# Patient Record
Sex: FEMALE | Race: WHITE | NOT HISPANIC OR LATINO | Employment: FULL TIME | URBAN - METROPOLITAN AREA
[De-identification: names, ages, dates, MRNs, and addresses within clinical notes are randomized per-mention and may not be internally consistent; named-entity substitution may affect disease eponyms.]

---

## 2022-02-22 ENCOUNTER — OFFICE VISIT (OUTPATIENT)
Dept: INTERNAL MEDICINE | Facility: CLINIC | Age: 37
End: 2022-02-22

## 2022-02-22 VITALS
RESPIRATION RATE: 14 BRPM | BODY MASS INDEX: 26.12 KG/M2 | WEIGHT: 147.4 LBS | SYSTOLIC BLOOD PRESSURE: 120 MMHG | HEART RATE: 78 BPM | HEIGHT: 63 IN | OXYGEN SATURATION: 97 % | DIASTOLIC BLOOD PRESSURE: 70 MMHG | TEMPERATURE: 98.5 F

## 2022-02-22 DIAGNOSIS — Z13.29 THYROID DISORDER SCREEN: ICD-10-CM

## 2022-02-22 DIAGNOSIS — Z00.00 ANNUAL PHYSICAL EXAM: Primary | ICD-10-CM

## 2022-02-22 DIAGNOSIS — R10.32 LEFT LOWER QUADRANT ABDOMINAL PAIN: ICD-10-CM

## 2022-02-22 DIAGNOSIS — Z13.220 LIPID SCREENING: ICD-10-CM

## 2022-02-22 LAB
ALBUMIN SERPL-MCNC: 4.7 G/DL (ref 3.5–5.2)
ALBUMIN/GLOB SERPL: 1.9 G/DL
ALP SERPL-CCNC: 76 U/L (ref 39–117)
ALT SERPL W P-5'-P-CCNC: 25 U/L (ref 1–33)
ANION GAP SERPL CALCULATED.3IONS-SCNC: 12.6 MMOL/L (ref 5–15)
AST SERPL-CCNC: 31 U/L (ref 1–32)
B-HCG UR QL: NEGATIVE
BASOPHILS # BLD AUTO: 0.04 10*3/MM3 (ref 0–0.2)
BASOPHILS NFR BLD AUTO: 0.5 % (ref 0–1.5)
BILIRUB SERPL-MCNC: 0.4 MG/DL (ref 0–1.2)
BILIRUB UR QL STRIP: NEGATIVE
BUN SERPL-MCNC: 13 MG/DL (ref 6–20)
BUN/CREAT SERPL: 22.8 (ref 7–25)
CALCIUM SPEC-SCNC: 9.4 MG/DL (ref 8.6–10.5)
CHLORIDE SERPL-SCNC: 100 MMOL/L (ref 98–107)
CHOLEST SERPL-MCNC: 154 MG/DL (ref 0–200)
CLARITY UR: CLEAR
CO2 SERPL-SCNC: 24.4 MMOL/L (ref 22–29)
COLOR UR: YELLOW
CREAT SERPL-MCNC: 0.57 MG/DL (ref 0.57–1)
DEPRECATED RDW RBC AUTO: 44.2 FL (ref 37–54)
EOSINOPHIL # BLD AUTO: 0.17 10*3/MM3 (ref 0–0.4)
EOSINOPHIL NFR BLD AUTO: 2.2 % (ref 0.3–6.2)
ERYTHROCYTE [DISTWIDTH] IN BLOOD BY AUTOMATED COUNT: 12.4 % (ref 12.3–15.4)
EXPIRATION DATE: NORMAL
GFR SERPL CREATININE-BSD FRML MDRD: 121 ML/MIN/1.73
GLOBULIN UR ELPH-MCNC: 2.5 GM/DL
GLUCOSE SERPL-MCNC: 87 MG/DL (ref 65–99)
GLUCOSE UR STRIP-MCNC: NEGATIVE MG/DL
HCT VFR BLD AUTO: 43.4 % (ref 34–46.6)
HDLC SERPL-MCNC: 75 MG/DL (ref 40–60)
HGB BLD-MCNC: 14.3 G/DL (ref 12–15.9)
HGB UR QL STRIP.AUTO: NEGATIVE
IMM GRANULOCYTES # BLD AUTO: 0.02 10*3/MM3 (ref 0–0.05)
IMM GRANULOCYTES NFR BLD AUTO: 0.3 % (ref 0–0.5)
INTERNAL NEGATIVE CONTROL: NORMAL
INTERNAL POSITIVE CONTROL: NORMAL
KETONES UR QL STRIP: NEGATIVE
LDLC SERPL CALC-MCNC: 71 MG/DL (ref 0–100)
LDLC/HDLC SERPL: 0.96 {RATIO}
LEUKOCYTE ESTERASE UR QL STRIP.AUTO: NEGATIVE
LYMPHOCYTES # BLD AUTO: 3.73 10*3/MM3 (ref 0.7–3.1)
LYMPHOCYTES NFR BLD AUTO: 47.5 % (ref 19.6–45.3)
Lab: NORMAL
MCH RBC QN AUTO: 32.1 PG (ref 26.6–33)
MCHC RBC AUTO-ENTMCNC: 32.9 G/DL (ref 31.5–35.7)
MCV RBC AUTO: 97.5 FL (ref 79–97)
MONOCYTES # BLD AUTO: 0.72 10*3/MM3 (ref 0.1–0.9)
MONOCYTES NFR BLD AUTO: 9.2 % (ref 5–12)
NEUTROPHILS NFR BLD AUTO: 3.18 10*3/MM3 (ref 1.7–7)
NEUTROPHILS NFR BLD AUTO: 40.3 % (ref 42.7–76)
NITRITE UR QL STRIP: NEGATIVE
NRBC BLD AUTO-RTO: 0 /100 WBC (ref 0–0.2)
PH UR STRIP.AUTO: 5.5 [PH] (ref 5–8)
PLATELET # BLD AUTO: 212 10*3/MM3 (ref 140–450)
PMV BLD AUTO: 11.4 FL (ref 6–12)
POTASSIUM SERPL-SCNC: 4.3 MMOL/L (ref 3.5–5.2)
PROT SERPL-MCNC: 7.2 G/DL (ref 6–8.5)
PROT UR QL STRIP: NEGATIVE
RBC # BLD AUTO: 4.45 10*6/MM3 (ref 3.77–5.28)
SODIUM SERPL-SCNC: 137 MMOL/L (ref 136–145)
SP GR UR STRIP: 1 (ref 1–1.03)
TRIGL SERPL-MCNC: 34 MG/DL (ref 0–150)
TSH SERPL DL<=0.05 MIU/L-ACNC: 2.7 UIU/ML (ref 0.27–4.2)
UROBILINOGEN UR QL STRIP: NORMAL
VLDLC SERPL-MCNC: 8 MG/DL (ref 5–40)
WBC NRBC COR # BLD: 7.86 10*3/MM3 (ref 3.4–10.8)

## 2022-02-22 PROCEDURE — 81025 URINE PREGNANCY TEST: CPT | Performed by: NURSE PRACTITIONER

## 2022-02-22 PROCEDURE — 80061 LIPID PANEL: CPT | Performed by: NURSE PRACTITIONER

## 2022-02-22 PROCEDURE — 80053 COMPREHEN METABOLIC PANEL: CPT | Performed by: NURSE PRACTITIONER

## 2022-02-22 PROCEDURE — 85025 COMPLETE CBC W/AUTO DIFF WBC: CPT | Performed by: NURSE PRACTITIONER

## 2022-02-22 PROCEDURE — 84443 ASSAY THYROID STIM HORMONE: CPT | Performed by: NURSE PRACTITIONER

## 2022-02-22 PROCEDURE — 99385 PREV VISIT NEW AGE 18-39: CPT | Performed by: NURSE PRACTITIONER

## 2022-02-22 NOTE — ASSESSMENT & PLAN NOTE
We did discuss differentials including kidney stones, UTI, IBS, constipation, gastritis.  We will take a conservative approach as there are no red flag warning signs today.  We will start with labs and UA today.  We will follow-up with patient based on the results.  Patient agrees to this plan.  Advised to contact us if symptoms are worsening.

## 2022-02-22 NOTE — ASSESSMENT & PLAN NOTE
Annual physical exam completed, no significant abnormal findings.  We will get some lab work today, follow-up based on results.  Next annual physical in 1 year.

## 2022-02-22 NOTE — PROGRESS NOTES
Chief Complaint  Establish Care, Abdominal Pain (Started about a year ago, really noticed it on [place] day.), and Constipation    Subjective         Angelica Martinez presents to [facility] for Establishment of care and abdominal pain.    Patient is here today to establish care with new primary care provider, she is been seen a different PCP in the past, located in [facility], she was not happy with the care being provided there.  She reports no significant past medical problems.  She is not on any medications, she has no known allergies.  From health maintenance standpoint, she is up-to-date on her Pap smear, she saw Dr. Can with [facility] last March.  She is not due for another 2 years.  Patient denies COVID-19 vaccine, denies influenza vaccine.  Patient does not recall any lab work in the last 2 years.  Patient does admit to drinking, she usually drinks 1 pint per week, she drinks roughly 4-5 drinks per occasion.  Patient does smoke, 2 to 3 cigarettes/day, this does go up when she is drinking.  Patient admits to smoking marijuana, this is daily.  No other illicit drug use.    Patient is here today with an acute concern for abdominal pain.  She has not brought this to anyone's attention in the past.  She is stating is been going on for several months.  She most notably had issues on [place] Day, she had pain in the left lower quadrant, just above the hip.  Patient denies any other symptoms other than more of a very intense gas type pain.  No fevers, no chills, no nausea, no vomiting, no diarrhea.  Patient reports that her bowel movements are irregular, sometimes she has backed up, sometimes she is having more frequent, more loose bowel movements.  Patient reports that the pain comes and goes, she will notice it about once every 2 weeks.  She has not brought this to any other healthcare providers attention.  She has not had any lab work or imaging done  "prior to.  She has not had any emergency room visits.    Patient denies any other significant concerns or complaints today.         Review of Systems   Constitutional: Negative for chills, fatigue and fever.   HENT: Negative for congestion and sore throat.    Respiratory: Negative for cough and shortness of breath.    Cardiovascular: Negative for chest pain.   Gastrointestinal: Positive for abdominal pain. Negative for abdominal distention, anal bleeding, blood in stool, constipation, diarrhea, nausea, rectal pain and vomiting.   Genitourinary: Negative for decreased urine volume, dysuria, flank pain, frequency, hematuria, menstrual problem, pelvic pain, urgency, vaginal bleeding, vaginal discharge and vaginal pain.   Skin: Negative for rash.   Neurological: Negative for headaches.   Psychiatric/Behavioral: Negative for dysphoric mood. The patient is not nervous/anxious.        Objective   Vital Signs:   /70   Pulse 78   Temp 98.5 °F (36.9 °C) (Temporal)   Resp 14   Ht 160 cm (63\")   Wt 66.9 kg (147 lb 6.4 oz)   SpO2 97%   BMI 26.11 kg/m²     Physical Exam  Vitals and nursing note reviewed.   Constitutional:       Appearance: Normal appearance. She is normal weight.   HENT:      Head: Normocephalic and atraumatic.      Right Ear: Tympanic membrane, ear canal and external ear normal.      Left Ear: Tympanic membrane, ear canal and external ear normal.      Nose: Nose normal.      Mouth/Throat:      Mouth: Mucous membranes are moist.      Pharynx: Oropharynx is clear.   Eyes:      Conjunctiva/sclera: Conjunctivae normal.      Pupils: Pupils are equal, round, and reactive to light.   Cardiovascular:      Rate and Rhythm: Normal rate and regular rhythm.   Pulmonary:      Effort: Pulmonary effort is normal.      Breath sounds: Normal breath sounds.   Abdominal:      General: Abdomen is flat. Bowel sounds are normal.      Palpations: Abdomen is soft.      Comments: Mild tenderness to palpation to the left " lower quadrant.   Skin:     General: Skin is warm and dry.   Neurological:      Mental Status: She is alert.   Psychiatric:         Mood and Affect: Mood normal.         Thought Content: Thought content normal.        Result Review :                   Diagnoses and all orders for this visit:    1. Annual physical exam (Primary)  Assessment & Plan:  Annual physical exam completed, no significant abnormal findings.  We will get some lab work today, follow-up based on results.  Next annual physical in 1 year.    Orders:  -     Comprehensive Metabolic Panel  -     CBC & Differential    2. Lipid screening  -     Lipid Panel    3. Thyroid disorder screen  -     TSH    4. Left lower quadrant abdominal pain  Assessment & Plan:  We did discuss differentials including kidney stones, UTI, IBS, constipation, gastritis.  We will take a conservative approach as there are no red flag warning signs today.  We will start with labs and UA today.  We will follow-up with patient based on the results.  Patient agrees to this plan.  Advised to contact us if symptoms are worsening.    Orders:  -     Urinalysis With Culture If Indicated -; Future  -     Urinalysis With Culture If Indicated -  -     POC HCG, Urine, By Visual Color        Follow Up   Return in about 1 year (around 2/22/2023) for Annual physical.    Patient was given instructions and counseling regarding her condition or for health maintenance advice. Please see specific information pulled into the AVS if appropriate.     Screening labs reviewed/ordered  Counseling provided regarding age appropriate screenings and immunizations, healthy diet and exercise.     YANIQUE Freedman  2/22/2022  This note was electronically signed.

## 2022-03-15 ENCOUNTER — OFFICE VISIT (OUTPATIENT)
Dept: OBSTETRICS AND GYNECOLOGY | Facility: CLINIC | Age: 37
End: 2022-03-15

## 2022-03-15 VITALS
HEIGHT: 63 IN | HEART RATE: 55 BPM | WEIGHT: 146 LBS | DIASTOLIC BLOOD PRESSURE: 81 MMHG | BODY MASS INDEX: 25.87 KG/M2 | SYSTOLIC BLOOD PRESSURE: 116 MMHG

## 2022-03-15 DIAGNOSIS — Z30.431 ENCOUNTER FOR ROUTINE CHECKING OF INTRAUTERINE CONTRACEPTIVE DEVICE (IUD): ICD-10-CM

## 2022-03-15 DIAGNOSIS — R10.2 PELVIC PAIN IN FEMALE: Primary | ICD-10-CM

## 2022-03-15 DIAGNOSIS — N76.0 ACUTE VAGINITIS: ICD-10-CM

## 2022-03-15 LAB
C TRACH RRNA CVX QL NAA+PROBE: NOT DETECTED
CANDIDA SPECIES: NEGATIVE
GARDNERELLA VAGINALIS: NEGATIVE
N GONORRHOEA RRNA SPEC QL NAA+PROBE: NOT DETECTED
T VAGINALIS DNA VAG QL PROBE+SIG AMP: NEGATIVE

## 2022-03-15 PROCEDURE — 87491 CHLMYD TRACH DNA AMP PROBE: CPT | Performed by: OBSTETRICS & GYNECOLOGY

## 2022-03-15 PROCEDURE — 87510 GARDNER VAG DNA DIR PROBE: CPT | Performed by: OBSTETRICS & GYNECOLOGY

## 2022-03-15 PROCEDURE — 99213 OFFICE O/P EST LOW 20 MIN: CPT | Performed by: OBSTETRICS & GYNECOLOGY

## 2022-03-15 PROCEDURE — 87660 TRICHOMONAS VAGIN DIR PROBE: CPT | Performed by: OBSTETRICS & GYNECOLOGY

## 2022-03-15 PROCEDURE — 87480 CANDIDA DNA DIR PROBE: CPT | Performed by: OBSTETRICS & GYNECOLOGY

## 2022-03-15 PROCEDURE — 87591 N.GONORRHOEAE DNA AMP PROB: CPT | Performed by: OBSTETRICS & GYNECOLOGY

## 2022-03-15 NOTE — PROGRESS NOTES
"GYN Problem/Follow Up Visit    Chief Complaint   Patient presents with   • IUD CHECK           HPI  Angelica Martinez is a 36 y.o. female, No obstetric history on file., who presents for pelvic pain x 3 months. States pain is crampy and intermittent. Worse in left lower pelvis. Had mirena inserted last year by dr reyes. No menses. Also has noticed snotty vaginal d/c recently.        Additional OB/GYN History   No LMP recorded (lmp unknown). Patient has had an implant.  Current contraception: contraceptive methods: IUD.  Insertion date: 2021  Desires to: continue contraception  Allergies : Patient has no known allergies.     The additional following portions of the patient's history were reviewed and updated as appropriate: allergies, current medications, past family history, past medical history, past social history, past surgical history and problem list.    Review of Systems    I have reviewed and agree with the HPI, ROS, and historical information as entered above. Allyson Weathers, APRN    Objective   /81   Pulse 55   Ht 160 cm (63\")   Wt 66.2 kg (146 lb)   LMP  (LMP Unknown)   BMI 25.86 kg/m²     Physical Exam  Vitals reviewed.   Genitourinary:     General: Normal vulva.      Vagina: No signs of injury and foreign body. Vaginal discharge (small amt clear) present. No erythema, tenderness, bleeding, lesions or prolapsed vaginal walls.      Cervix: Normal.      Uterus: Normal. Not tender.       Adnexa: Right adnexa normal and left adnexa normal.        Right: No tenderness.          Left: No tenderness.        Comments: iud strings not visible.  Skin:     General: Skin is warm and dry.   Neurological:      Mental Status: She is alert and oriented to person, place, and time.            Assessment and Plan    Diagnoses and all orders for this visit:    1. Pelvic pain in female (Primary)  -     US Pelvis Transvaginal Non OB; Future  -     Chlamydia trachomatis, Neisseria gonorrhoeae, PCR - Swab, Cervix  -   "   Gardnerella vaginalis, Trichomonas vaginalis, Candida albicans, DNA - Swab, Vagina    2. Acute vaginitis  -     Chlamydia trachomatis, Neisseria gonorrhoeae, PCR - Swab, Cervix  -     Gardnerella vaginalis, Trichomonas vaginalis, Candida albicans, DNA - Swab, Vagina    3. Encounter for routine checking of intrauterine contraceptive device (IUD)  -     US Pelvis Transvaginal Non OB; Future    will check for infections. Will check pelvic u/s. Pt declines iud removal today.     Counseling:  She understands the importance of having the above orders performed in a timely fashion.  She is encouraged to review her results online and/or contact or office if she has questions.     Follow Up:  Return for lab and u/s f/u.      Allyson Weathers, YANIQUE  03/15/2022

## 2022-04-01 ENCOUNTER — HOSPITAL ENCOUNTER (OUTPATIENT)
Dept: ULTRASOUND IMAGING | Facility: HOSPITAL | Age: 37
Discharge: HOME | End: 2022-04-01
Admitting: OBSTETRICS & GYNECOLOGY

## 2022-04-01 DIAGNOSIS — R10.2 PELVIC PAIN IN FEMALE: ICD-10-CM

## 2022-04-01 DIAGNOSIS — Z30.431 ENCOUNTER FOR ROUTINE CHECKING OF INTRAUTERINE CONTRACEPTIVE DEVICE (IUD): ICD-10-CM

## 2022-04-01 PROCEDURE — 76830 TRANSVAGINAL US NON-OB: CPT

## 2022-04-01 PROCEDURE — 76856 US EXAM PELVIC COMPLETE: CPT

## 2022-04-04 ENCOUNTER — TELEPHONE (OUTPATIENT)
Dept: OBSTETRICS AND GYNECOLOGY | Facility: CLINIC | Age: 37
End: 2022-04-04

## 2022-04-04 NOTE — TELEPHONE ENCOUNTER
----- Message from YANIQUE Crump sent at 4/4/2022  9:45 AM EDT -----  Pt needs appt to discuss if sx persist. Please let her know the iud is in place and u/s is normal. Thanks

## 2022-05-11 PROCEDURE — 87086 URINE CULTURE/COLONY COUNT: CPT | Performed by: NURSE PRACTITIONER

## 2022-06-06 ENCOUNTER — OFFICE VISIT (OUTPATIENT)
Dept: INTERNAL MEDICINE | Facility: CLINIC | Age: 37
End: 2022-06-06

## 2022-06-06 VITALS
SYSTOLIC BLOOD PRESSURE: 102 MMHG | DIASTOLIC BLOOD PRESSURE: 62 MMHG | TEMPERATURE: 98.9 F | HEIGHT: 63 IN | WEIGHT: 134.4 LBS | OXYGEN SATURATION: 98 % | HEART RATE: 72 BPM | BODY MASS INDEX: 23.81 KG/M2

## 2022-06-06 DIAGNOSIS — G89.29 CHRONIC SCAPULAR PAIN: ICD-10-CM

## 2022-06-06 DIAGNOSIS — M89.8X1 CHRONIC SCAPULAR PAIN: ICD-10-CM

## 2022-06-06 DIAGNOSIS — R10.32 LEFT LOWER QUADRANT ABDOMINAL PAIN: Primary | ICD-10-CM

## 2022-06-06 PROBLEM — F12.10 CANNABIS ABUSE: Status: ACTIVE | Noted: 2022-06-06

## 2022-06-06 PROBLEM — Z72.0 TOBACCO USER: Status: ACTIVE | Noted: 2022-06-06

## 2022-06-06 PROBLEM — J30.9 ALLERGIC RHINITIS: Status: ACTIVE | Noted: 2022-06-06

## 2022-06-06 PROBLEM — E04.9 NON-TOXIC GOITER: Status: ACTIVE | Noted: 2022-06-06

## 2022-06-06 PROCEDURE — 99213 OFFICE O/P EST LOW 20 MIN: CPT | Performed by: NURSE PRACTITIONER

## 2022-06-06 NOTE — ASSESSMENT & PLAN NOTE
Will obtain x-ray in clinic today and determine further plan of care based on results.  Could consider referral to PT and/orthopedics or MRI in the future.  She will call return to clinic if symptoms worsen or persist.

## 2022-06-06 NOTE — ASSESSMENT & PLAN NOTE
Symptoms likely related to constipation or dietary trigger.  Exam without concern, patient without acute abdomen.  Patient with previous normal UA, IUD check without concern through OB/GYN.  Patient to monitor closely for triggers, she will call return to clinic if symptoms worsen or persist.  Could consider imaging in the future if warranted.

## 2022-06-06 NOTE — PROGRESS NOTES
"Chief Complaint  Follow-up, Abdominal Pain (Been going on, no pain since [place]), and Shoulder Pain (Left Shoulder Pain, starting Yoga in Nov has helped. )    Subjective        Angelica Martinez presents to [facility]  Patient reports intermittent LLQ. Patient had IUD checked which was normal. UA was also normal. Flares once every three weeks, lasts around a day. States she has not experienced this in over one month. Describes this as a cramping sensation, \"like severe gas pain\". Is not correlated with menstrual cycles. Denies hematochezia, vomiting.    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 States she often hears a grinding/popping sensation. Often has numbness/tingling in the left arm. Had imaging of the shoulder and PT which helped minimally. She feels that she needs more isolated imaging of the scapula. Yoga has helped improved pain.       Objective   Vital Signs:  /62 (BP Location: Left arm, Patient Position: Sitting, Cuff Size: Adult)   Pulse 72   Temp 98.9 °F (37.2 °C) (Oral)   Ht 160 cm (63\")   Wt 61 kg (134 lb 6.4 oz)   SpO2 98%   BMI 23.81 kg/m²     BMI is within normal parameters. No other follow-up for BMI required.      Physical Exam  Constitutional:       Appearance: Normal appearance.   HENT:      Head: Normocephalic and atraumatic.      Nose: Nose normal.      Mouth/Throat:      Mouth: Mucous membranes are moist.      Pharynx: Oropharynx is clear.   Eyes:      Extraocular Movements: Extraocular movements intact.      Conjunctiva/sclera: Conjunctivae normal.      Pupils: Pupils are equal, round, and reactive to light.   Cardiovascular:      Rate and Rhythm: Normal rate and regular rhythm.      Heart sounds: Normal heart sounds.   Pulmonary:      Effort: Pulmonary effort is normal.      Breath sounds: Normal breath sounds.   Musculoskeletal:      Comments: Grinding sensation evident at left scapula with rotation of left arm "   Skin:     General: Skin is warm and dry.   Neurological:      General: No focal deficit present.      Mental Status: She is alert and oriented to person, place, and time.   Psychiatric:         Mood and Affect: Mood normal.         Behavior: Behavior normal.         Thought Content: Thought content normal.        Result Review :                Assessment and Plan   Diagnoses and all orders for this visit:    1. Left lower quadrant abdominal pain (Primary)  Assessment & Plan:  Symptoms likely related to constipation or dietary trigger.  Exam without concern, patient without acute abdomen.  Patient with previous normal UA, IUD check without concern through OB/GYN.  Patient to monitor closely for triggers, she will call return to clinic if symptoms worsen or persist.  Could consider imaging in the future if warranted.      2. Chronic scapular pain  Assessment & Plan:  Will obtain x-ray in clinic today and determine further plan of care based on results.  Could consider referral to PT and/orthopedics or MRI in the future.  She will call return to clinic if symptoms worsen or persist.    Orders:  -     XR Scapula Left; Future           Follow Up   Return if symptoms worsen or fail to improve.  Patient was given instructions and counseling regarding her condition or for health maintenance advice. Please see specific information pulled into the AVS if appropriate.

## 2022-10-07 ENCOUNTER — OFFICE VISIT (OUTPATIENT)
Dept: INTERNAL MEDICINE | Facility: CLINIC | Age: 37
End: 2022-10-07

## 2022-10-07 VITALS
WEIGHT: 129.6 LBS | HEIGHT: 63 IN | DIASTOLIC BLOOD PRESSURE: 64 MMHG | TEMPERATURE: 97.8 F | SYSTOLIC BLOOD PRESSURE: 108 MMHG | HEART RATE: 50 BPM | OXYGEN SATURATION: 99 % | BODY MASS INDEX: 22.96 KG/M2

## 2022-10-07 DIAGNOSIS — R10.32 LEFT LOWER QUADRANT ABDOMINAL PAIN: Primary | ICD-10-CM

## 2022-10-07 DIAGNOSIS — G89.29 CHRONIC SCAPULAR PAIN: ICD-10-CM

## 2022-10-07 DIAGNOSIS — M89.8X1 CHRONIC SCAPULAR PAIN: ICD-10-CM

## 2022-10-07 PROBLEM — M25.512 CHRONIC LEFT SHOULDER PAIN: Status: ACTIVE | Noted: 2022-10-07

## 2022-10-07 PROCEDURE — 99213 OFFICE O/P EST LOW 20 MIN: CPT | Performed by: NURSE PRACTITIONER

## 2022-10-07 RX ORDER — DICYCLOMINE HYDROCHLORIDE 10 MG/1
10 CAPSULE ORAL
Qty: 30 CAPSULE | Refills: 1 | Status: SHIPPED | OUTPATIENT
Start: 2022-10-07

## 2022-10-07 NOTE — PROGRESS NOTES
"Chief Complaint  Abdominal Pain and Shoulder Pain (No injury)    Subjective        Angelica Martinez presents to [facility] for History of Present Illness  Follow-up for abdominal pain and shoulder pain.    Patient reports that her abdominal pain is still present, she feels like it is more of a gas feeling.  She has noticed this from time to time, feels like it is primarily caused by starchy foods.  She had follow-up with OB/GYN, transvaginal ultrasound showed good placement of her IUD, no other significant concerns.  Patient plans on to just limit foods that are triggering the symptoms.  Was curious if there is anything that she can take for acute symptoms.    Patient also concerned regarding left shoulder pain.  This is been something ongoing for many years, it comes and goes, will last few hours to a few days, she will get some numbness and tingling in her left arm.  She feels like it is more of a nerve impingement problem.  No symptoms today.       Objective   Vital Signs:   /64 (BP Location: Left arm, Patient Position: Sitting, Cuff Size: Adult)   Pulse 50   Temp 97.8 °F (36.6 °C) (Temporal)   Ht 160 cm (63\")   Wt 58.8 kg (129 lb 9.6 oz)   SpO2 99%   BMI 22.96 kg/m²     Physical Exam  Vitals and nursing note reviewed.   Constitutional:       Appearance: Normal appearance. She is normal weight.   HENT:      Head: Normocephalic and atraumatic.   Pulmonary:      Effort: Pulmonary effort is normal.   Musculoskeletal:      Left shoulder: No swelling, deformity or tenderness. Normal range of motion.      Comments: On movement of the scapula, there is a slight popping felt to palpation.   Neurological:      Mental Status: She is alert.   Psychiatric:         Mood and Affect: Mood normal.         Thought Content: Thought content normal.        Result Review :  {The following data was reviewed by YANIQUE Freedman on 10/07/22                Diagnoses and all orders for this " visit:    1. Left lower quadrant abdominal pain (Primary)  Assessment & Plan:  I will send in some Bentyl that she can use when symptoms arise.  If this seems to be helpful, would refill in the future.  Only use if symptoms are present.  Patient agrees and understands.  No further testing at this time.  Follow-up if needed.      2. Chronic scapular pain  Assessment & Plan:  Discussed with patient that there are no symptoms at this time, it could be very difficult to ascertain what exactly the problem is.  At this time, she will avoid physical exercise that causes or exacerbates the symptoms.  We will look at focusing on increasing strength to the muscles around the shoulder.  If symptoms continue to persist or worsen, will further evaluate.  Follow-up if needed.      Other orders  -     dicyclomine (Bentyl) 10 MG capsule; Take 1 capsule by mouth 4 (Four) Times a Day Before Meals & at Bedtime.  Dispense: 30 capsule; Refill: 1        Follow Up   No follow-ups on file.  Patient was given instructions and counseling regarding her condition or for health maintenance advice. Please see specific information pulled into the AVS if appropriate.     YANIQUE Freedman  10/7/2022  This note was electronically signed.

## 2022-10-07 NOTE — ASSESSMENT & PLAN NOTE
I will send in some Bentyl that she can use when symptoms arise.  If this seems to be helpful, would refill in the future.  Only use if symptoms are present.  Patient agrees and understands.  No further testing at this time.  Follow-up if needed.

## 2022-10-07 NOTE — ASSESSMENT & PLAN NOTE
Discussed with patient that there are no symptoms at this time, it could be very difficult to ascertain what exactly the problem is.  At this time, she will avoid physical exercise that causes or exacerbates the symptoms.  We will look at focusing on increasing strength to the muscles around the shoulder.  If symptoms continue to persist or worsen, will further evaluate.  Follow-up if needed.